# Patient Record
Sex: FEMALE | Race: AMERICAN INDIAN OR ALASKA NATIVE | HISPANIC OR LATINO | Employment: FULL TIME | URBAN - METROPOLITAN AREA
[De-identification: names, ages, dates, MRNs, and addresses within clinical notes are randomized per-mention and may not be internally consistent; named-entity substitution may affect disease eponyms.]

---

## 2023-06-27 ENCOUNTER — HOSPITAL ENCOUNTER (EMERGENCY)
Facility: HOSPITAL | Age: 48
Discharge: HOME | End: 2023-06-27
Attending: STUDENT IN AN ORGANIZED HEALTH CARE EDUCATION/TRAINING PROGRAM
Payer: MEDICAID

## 2023-06-27 VITALS
WEIGHT: 209.44 LBS | SYSTOLIC BLOOD PRESSURE: 181 MMHG | TEMPERATURE: 98 F | HEIGHT: 60 IN | RESPIRATION RATE: 20 BRPM | DIASTOLIC BLOOD PRESSURE: 124 MMHG | BODY MASS INDEX: 41.12 KG/M2 | HEART RATE: 98 BPM | OXYGEN SATURATION: 99 %

## 2023-06-27 DIAGNOSIS — M25.511 RIGHT SHOULDER PAIN, UNSPECIFIED CHRONICITY: Primary | ICD-10-CM

## 2023-06-27 DIAGNOSIS — S46.001A ROTATOR CUFF INJURY, RIGHT, INITIAL ENCOUNTER: ICD-10-CM

## 2023-06-27 PROCEDURE — 99283 EMERGENCY DEPT VISIT LOW MDM: CPT

## 2023-06-27 RX ORDER — CYCLOBENZAPRINE HCL 10 MG
10 TABLET ORAL 3 TIMES DAILY PRN
Qty: 15 TABLET | Refills: 0 | Status: SHIPPED | OUTPATIENT
Start: 2023-06-27 | End: 2023-07-02

## 2023-06-28 NOTE — ED PROVIDER NOTES
Encounter Date: 6/27/2023       History     Chief Complaint   Patient presents with    Shoulder Pain     Right shoulder pain x6 months after lifting objects at workplace.      48-year-old female presents to ED for right shoulder discomfort.  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  States it hurts primarily in the superior aspect of the right shoulder.  States she reaggravated it recently so she decided to present.  States her current job is a lot less physically demanding.  Denies any distal paresthesias or weakness.  No recurrent traumas.  No deformity.  Grossly retains full range of motion however uncomfortable for her to ABduct the arm.  No other complaints or concerns at this time.    Review of patient's allergies indicates:   Allergen Reactions    Toradol [ketorolac]      No past medical history on file.  No past surgical history on file.  No family history on file.     Review of Systems   Constitutional:  Negative for chills, diaphoresis and fever.   HENT:  Negative for congestion, rhinorrhea, sinus pain and sore throat.    Eyes:  Negative for pain, discharge and itching.   Respiratory:  Negative for cough, chest tightness and shortness of breath.    Cardiovascular:  Negative for chest pain and palpitations.   Gastrointestinal:  Negative for abdominal pain, nausea and vomiting.   Genitourinary:  Negative for dysuria, flank pain and hematuria.   Musculoskeletal:  Negative for back pain and myalgias.        R shoulder pain   Skin:  Negative for color change and rash.   Neurological:  Negative for dizziness, weakness and headaches.   Psychiatric/Behavioral:  Negative for confusion. The patient is not hyperactive.      Physical Exam     Initial Vitals [06/27/23 2149]   BP Pulse Resp Temp SpO2   (!) 181/124 98 20 97.9 °F (36.6 °C) 99 %      MAP       --         Physical Exam    Vitals reviewed.  Constitutional: She appears well-developed and  well-nourished. She is not diaphoretic. No distress.   HENT:   Head: Normocephalic and atraumatic.   Eyes: Conjunctivae and EOM are normal. Pupils are equal, round, and reactive to light.   Neck: Neck supple. No tracheal deviation present.   Normal range of motion.  Cardiovascular:  Normal rate, regular rhythm, normal heart sounds and intact distal pulses.           Pulmonary/Chest: Breath sounds normal. No respiratory distress.   Abdominal: Abdomen is soft. There is no abdominal tenderness. There is no rebound and no guarding.   Musculoskeletal:         General: Normal range of motion.        Arms:       Cervical back: Normal range of motion and neck supple.     Neurological: She is alert and oriented to person, place, and time. She has normal strength. GCS score is 15. GCS eye subscore is 4. GCS verbal subscore is 5. GCS motor subscore is 6.   Skin: Skin is warm and dry. Capillary refill takes less than 2 seconds. No rash noted.   Psychiatric: She has a normal mood and affect. Her behavior is normal. Judgment and thought content normal.       ED Course   Procedures  Labs Reviewed - No data to display       Imaging Results    None          Medications - No data to display  Medical Decision Making:   History:   Old Medical Records: I decided to obtain old medical records.  Initial Assessment:   48-year-old female presents to ED for several months right shoulder pain  Differential Diagnosis:   Sprain versus strain  Dislocation  Fracture  Impingement  Rotator cuff injury  ED Management:  clinically has a supraspinatus shoulder injury.  No evidence of dislocation, no reports of trauma or indications for x-ray imaging at this time.  Referred to both PCP and orthopedics for suspected MRI for definitive evaluation and expected rehab versus surgical treatment.  Will continue to treat symptomatically and supportively at this time.  Prescribed muscle relaxer.  Is to minimize heavy lifting or overuse of affected shoulder.   Ultimately provided strict return precautions and stable for discharge.  Released. (David)                        Clinical Impression:   Final diagnoses:  [M25.511] Right shoulder pain, unspecified chronicity (Primary)  [S46.001A] Rotator cuff injury, right, initial encounter        ED Disposition Condition    Discharge Stable          ED Prescriptions       Medication Sig Dispense Start Date End Date Auth. Provider    cyclobenzaprine (FLEXERIL) 10 MG tablet Take 1 tablet (10 mg total) by mouth 3 (three) times daily as needed for Muscle spasms. 15 tablet 6/27/2023 7/2/2023 Lorenzo Hernandez MD          Follow-up Information       Follow up With Specialties Details Why Contact Info    [facility] Emergency Medicine  As needed, If symptoms worsen [address] [facility] [address] [phone]    Primary  Schedule an appointment as soon as possible for a visit in 1 week      Ortho   next available              Lorenzo Hernandez MD  06/28/23 8699

## 2023-08-01 ENCOUNTER — OFFICE VISIT (OUTPATIENT)
Dept: INTERNAL MEDICINE | Facility: CLINIC | Age: 48
End: 2023-08-01
Payer: MEDICAID

## 2023-08-01 VITALS
WEIGHT: 211 LBS | TEMPERATURE: 99 F | HEART RATE: 106 BPM | RESPIRATION RATE: 16 BRPM | DIASTOLIC BLOOD PRESSURE: 92 MMHG | BODY MASS INDEX: 41.43 KG/M2 | HEIGHT: 60 IN | SYSTOLIC BLOOD PRESSURE: 154 MMHG

## 2023-08-01 DIAGNOSIS — I10 PRIMARY HYPERTENSION: Primary | ICD-10-CM

## 2023-08-01 DIAGNOSIS — Z91.199 NONCOMPLIANCE BY DECLINING SERVICE: ICD-10-CM

## 2023-08-01 PROBLEM — Z00.00 WELLNESS EXAMINATION: Status: ACTIVE | Noted: 2023-08-01

## 2023-08-01 PROCEDURE — 99203 PR OFFICE/OUTPT VISIT, NEW, LEVL III, 30-44 MIN: ICD-10-PCS | Mod: S$PBB,,, | Performed by: NURSE PRACTITIONER

## 2023-08-01 PROCEDURE — 1160F RVW MEDS BY RX/DR IN RCRD: CPT | Mod: CPTII,,, | Performed by: NURSE PRACTITIONER

## 2023-08-01 PROCEDURE — 3008F PR BODY MASS INDEX (BMI) DOCUMENTED: ICD-10-PCS | Mod: CPTII,,, | Performed by: NURSE PRACTITIONER

## 2023-08-01 PROCEDURE — 99203 OFFICE O/P NEW LOW 30 MIN: CPT | Mod: S$PBB,,, | Performed by: NURSE PRACTITIONER

## 2023-08-01 PROCEDURE — 1159F PR MEDICATION LIST DOCUMENTED IN MEDICAL RECORD: ICD-10-PCS | Mod: CPTII,,, | Performed by: NURSE PRACTITIONER

## 2023-08-01 PROCEDURE — 3077F PR MOST RECENT SYSTOLIC BLOOD PRESSURE >= 140 MM HG: ICD-10-PCS | Mod: CPTII,,, | Performed by: NURSE PRACTITIONER

## 2023-08-01 PROCEDURE — 3080F PR MOST RECENT DIASTOLIC BLOOD PRESSURE >= 90 MM HG: ICD-10-PCS | Mod: CPTII,,, | Performed by: NURSE PRACTITIONER

## 2023-08-01 PROCEDURE — 3080F DIAST BP >= 90 MM HG: CPT | Mod: CPTII,,, | Performed by: NURSE PRACTITIONER

## 2023-08-01 PROCEDURE — 1159F MED LIST DOCD IN RCRD: CPT | Mod: CPTII,,, | Performed by: NURSE PRACTITIONER

## 2023-08-01 PROCEDURE — 1160F PR REVIEW ALL MEDS BY PRESCRIBER/CLIN PHARMACIST DOCUMENTED: ICD-10-PCS | Mod: CPTII,,, | Performed by: NURSE PRACTITIONER

## 2023-08-01 PROCEDURE — 3077F SYST BP >= 140 MM HG: CPT | Mod: CPTII,,, | Performed by: NURSE PRACTITIONER

## 2023-08-01 PROCEDURE — 99214 OFFICE O/P EST MOD 30 MIN: CPT | Mod: PBBFAC | Performed by: NURSE PRACTITIONER

## 2023-08-01 PROCEDURE — 3008F BODY MASS INDEX DOCD: CPT | Mod: CPTII,,, | Performed by: NURSE PRACTITIONER

## 2023-08-01 RX ORDER — MULTIVITAMIN
1 TABLET ORAL
COMMUNITY

## 2023-08-01 NOTE — PROGRESS NOTES
"  LUISITO Flores   [facility]  [address] [address]      PATIENT NAME: Beth Fragoso  : 1975  DATE: 23  MRN: [id number]      Patient PCP Information       Provider PCP Type    LUISITO Flores General            Reason for Visit / Chief Complaint: Establish Care       History of Present Illness / Problem Focused Workflow     Beth Fragoso presents to the clinic with Establish Care     49 yo female here to establish care. PMH HTN.     2023  Pt here today to establish care, dicussed with patient about ordering wellness labs today and starting treatment for high blood pressure, pt reports today that due to her cultural background of  she does not like to "give her blood" for any reasons. I discussed with the pt that while I do appreciate cultural considerations for patients, there is due diligence regarding medication prescriptions and evaluation of various systems included but not limited to evaluation of kidney function. The pt reports that she also in not interested in taking any type of medications for high blood pressure; discussed with pt purpose of Primary Care Providers, the pt verbalized understanding but now reports that she wants to see an MD and means no disrespect for me as an NP but feels that an MD will understand her better regarding not completing labs. Infomred patient that I would send a message to leaders to request appointment with MD and they would reach out. No f/u appt made.   Denies chest pain, shortness of breath, cough, fever, headache, dizziness, weakness, abdominal pain, nausea, vomiting, diarrhea, constipation, black/bloody stools, unplanned weight loss, night sweats, changes in urinary patterns, burning/odor with urination, depression, anxiety, and SI/HI.             Review of Systems     Review of Systems      Medications and Allergies     Medications  Current " Outpatient Medications   Medication Instructions    multivitamin (THERAGRAN) per tablet 1 tablet, Oral         Allergies  Review of patient's allergies indicates:   Allergen Reactions    Toradol [ketorolac]        Physical Examination     Visit Vitals  BP (!) 154/92   Pulse 106   Temp 98.5 °F (36.9 °C)   Resp 16   Ht 5' (1.524 m)   Wt 95.7 kg (211 lb)   LMP 07/21/2023   BMI 41.21 kg/m²       Physical Exam      Results     Assessment        ICD-10-CM ICD-9-CM   1. Primary hypertension  I10 401.9   2. Noncompliance by declining service  Z91.199 V64.2        Plan      Problem List Items Addressed This Visit          Cardiac/Vascular    Primary hypertension - Primary    Overview     Low Sodium Diet (Dash Diet - less than 2 grams of sodium per day).  Maintain healthy weight with goal BMI <30. Exercise 30 minutes per day 5 days per week.           Current Assessment & Plan     /96          Other Visit Diagnoses       Noncompliance by declining service                Future Appointments   Date Time Provider Department Center   10/3/2023  8:20 AM Etta Dominguez NP [facility]        Follow up in 15 days (on 8/16/2023), or if symptoms worsen or fail to improve.      Signature:     [facility]  [address] [facility] [address]    Date of encounter: 8/1/23

## 2023-08-29 ENCOUNTER — HOSPITAL ENCOUNTER (EMERGENCY)
Facility: HOSPITAL | Age: 48
Discharge: HOME | End: 2023-08-30
Attending: STUDENT IN AN ORGANIZED HEALTH CARE EDUCATION/TRAINING PROGRAM
Payer: MEDICAID

## 2023-08-29 DIAGNOSIS — R10.11 RIGHT UPPER QUADRANT ABDOMINAL PAIN: ICD-10-CM

## 2023-08-29 DIAGNOSIS — I10 HYPERTENSION, UNSPECIFIED TYPE: Primary | ICD-10-CM

## 2023-08-29 DIAGNOSIS — R06.02 SHORTNESS OF BREATH: ICD-10-CM

## 2023-08-29 DIAGNOSIS — E27.8 ADRENAL NODULE: ICD-10-CM

## 2023-08-29 LAB
ALBUMIN SERPL-MCNC: 3.6 G/DL (ref 3.5–5)
ALBUMIN/GLOB SERPL: 1.1 RATIO (ref 1.1–2)
ALP SERPL-CCNC: 76 UNIT/L (ref 40–150)
ALT SERPL-CCNC: 17 UNIT/L (ref 0–55)
AMPHET UR QL SCN: NEGATIVE
APPEARANCE UR: CLEAR
AST SERPL-CCNC: 15 UNIT/L (ref 5–34)
B-HCG SERPL QL: NEGATIVE
BACTERIA #/AREA URNS AUTO: ABNORMAL /HPF
BARBITURATE SCN PRESENT UR: NEGATIVE
BASOPHILS # BLD AUTO: 0.04 X10(3)/MCL
BASOPHILS NFR BLD AUTO: 0.4 %
BENZODIAZ UR QL SCN: NEGATIVE
BILIRUB SERPL-MCNC: 0.2 MG/DL
BILIRUB UR QL STRIP.AUTO: NEGATIVE
BNP BLD-MCNC: 17.4 PG/ML
BUN SERPL-MCNC: 16.2 MG/DL (ref 7–18.7)
CALCIUM SERPL-MCNC: 9.4 MG/DL (ref 8.4–10.2)
CANNABINOIDS UR QL SCN: NEGATIVE
CHLORIDE SERPL-SCNC: 105 MMOL/L (ref 98–107)
CO2 SERPL-SCNC: 28 MMOL/L (ref 22–29)
COCAINE UR QL SCN: NEGATIVE
COLOR UR: YELLOW
CREAT SERPL-MCNC: 0.93 MG/DL (ref 0.55–1.02)
EOSINOPHIL # BLD AUTO: 0.3 X10(3)/MCL (ref 0–0.9)
EOSINOPHIL NFR BLD AUTO: 2.7 %
ERYTHROCYTE [DISTWIDTH] IN BLOOD BY AUTOMATED COUNT: 13.9 % (ref 11.5–17)
ETHANOL SERPL-MCNC: <10 MG/DL
FENTANYL UR QL SCN: NEGATIVE
GFR SERPLBLD CREATININE-BSD FMLA CKD-EPI: >60 MLS/MIN/1.73/M2
GLOBULIN SER-MCNC: 3.2 GM/DL (ref 2.4–3.5)
GLUCOSE SERPL-MCNC: 89 MG/DL (ref 74–100)
GLUCOSE UR QL STRIP.AUTO: NEGATIVE
HCT VFR BLD AUTO: 39.2 % (ref 37–47)
HGB BLD-MCNC: 13 G/DL (ref 12–16)
IMM GRANULOCYTES # BLD AUTO: 0.08 X10(3)/MCL (ref 0–0.04)
IMM GRANULOCYTES NFR BLD AUTO: 0.7 %
KETONES UR QL STRIP.AUTO: NEGATIVE
LEUKOCYTE ESTERASE UR QL STRIP.AUTO: ABNORMAL
LYMPHOCYTES # BLD AUTO: 3.69 X10(3)/MCL (ref 0.6–4.6)
LYMPHOCYTES NFR BLD AUTO: 33.4 %
MCH RBC QN AUTO: 31.3 PG (ref 27–31)
MCHC RBC AUTO-ENTMCNC: 33.2 G/DL (ref 33–36)
MCV RBC AUTO: 94.2 FL (ref 80–94)
MDMA UR QL SCN: NEGATIVE
MONOCYTES # BLD AUTO: 0.76 X10(3)/MCL (ref 0.1–1.3)
MONOCYTES NFR BLD AUTO: 6.9 %
NEUTROPHILS # BLD AUTO: 6.17 X10(3)/MCL (ref 2.1–9.2)
NEUTROPHILS NFR BLD AUTO: 55.9 %
NITRITE UR QL STRIP.AUTO: NEGATIVE
NRBC BLD AUTO-RTO: 0 %
OPIATES UR QL SCN: NEGATIVE
PCP UR QL: POSITIVE
PH UR STRIP.AUTO: 5.5 [PH]
PH UR: 5.5 [PH] (ref 3–11)
PLATELET # BLD AUTO: 318 X10(3)/MCL (ref 130–400)
PMV BLD AUTO: 9.9 FL (ref 7.4–10.4)
POTASSIUM SERPL-SCNC: 4.5 MMOL/L (ref 3.5–5.1)
PROT SERPL-MCNC: 6.8 GM/DL (ref 6.4–8.3)
PROT UR QL STRIP.AUTO: NEGATIVE
RBC # BLD AUTO: 4.16 X10(6)/MCL (ref 4.2–5.4)
RBC #/AREA URNS AUTO: ABNORMAL /HPF
RBC UR QL AUTO: NEGATIVE
SODIUM SERPL-SCNC: 142 MMOL/L (ref 136–145)
SP GR UR STRIP.AUTO: 1.02 (ref 1–1.03)
SPECIFIC GRAVITY, URINE AUTO (.000) (OHS): 1.02 (ref 1–1.03)
SQUAMOUS #/AREA URNS AUTO: <5 /HPF
TROPONIN I SERPL-MCNC: <0.01 NG/ML (ref 0–0.04)
UROBILINOGEN UR STRIP-ACNC: 0.2
WBC # SPEC AUTO: 11.04 X10(3)/MCL (ref 4.5–11.5)
WBC #/AREA URNS AUTO: <5 /HPF

## 2023-08-29 PROCEDURE — 83880 ASSAY OF NATRIURETIC PEPTIDE: CPT | Performed by: PHYSICIAN ASSISTANT

## 2023-08-29 PROCEDURE — 84484 ASSAY OF TROPONIN QUANT: CPT | Performed by: PHYSICIAN ASSISTANT

## 2023-08-29 PROCEDURE — 93010 EKG 12-LEAD: ICD-10-PCS | Mod: ,,, | Performed by: INTERNAL MEDICINE

## 2023-08-29 PROCEDURE — 81001 URINALYSIS AUTO W/SCOPE: CPT | Performed by: PHYSICIAN ASSISTANT

## 2023-08-29 PROCEDURE — 80053 COMPREHEN METABOLIC PANEL: CPT | Performed by: PHYSICIAN ASSISTANT

## 2023-08-29 PROCEDURE — 81025 URINE PREGNANCY TEST: CPT | Performed by: PHYSICIAN ASSISTANT

## 2023-08-29 PROCEDURE — 93005 ELECTROCARDIOGRAM TRACING: CPT

## 2023-08-29 PROCEDURE — 99285 EMERGENCY DEPT VISIT HI MDM: CPT | Mod: 25

## 2023-08-29 PROCEDURE — 93010 ELECTROCARDIOGRAM REPORT: CPT | Mod: ,,, | Performed by: INTERNAL MEDICINE

## 2023-08-29 PROCEDURE — 85025 COMPLETE CBC W/AUTO DIFF WBC: CPT | Performed by: PHYSICIAN ASSISTANT

## 2023-08-29 PROCEDURE — 25000003 PHARM REV CODE 250

## 2023-08-29 PROCEDURE — 82077 ASSAY SPEC XCP UR&BREATH IA: CPT | Performed by: STUDENT IN AN ORGANIZED HEALTH CARE EDUCATION/TRAINING PROGRAM

## 2023-08-29 PROCEDURE — 80307 DRUG TEST PRSMV CHEM ANLYZR: CPT | Performed by: STUDENT IN AN ORGANIZED HEALTH CARE EDUCATION/TRAINING PROGRAM

## 2023-08-29 RX ORDER — HYDROCODONE BITARTRATE AND ACETAMINOPHEN 5; 325 MG/1; MG/1
1 TABLET ORAL
Status: COMPLETED | OUTPATIENT
Start: 2023-08-29 | End: 2023-08-29

## 2023-08-29 RX ORDER — AMLODIPINE BESYLATE 5 MG/1
5 TABLET ORAL DAILY
Qty: 30 TABLET | Refills: 0 | Status: SHIPPED | OUTPATIENT
Start: 2023-08-29 | End: 2023-09-28

## 2023-08-29 RX ADMIN — HYDROCODONE BITARTRATE AND ACETAMINOPHEN 1 TABLET: 5; 325 TABLET ORAL at 08:08

## 2023-08-29 RX ADMIN — IOPAMIDOL 100 ML: 755 INJECTION, SOLUTION INTRAVENOUS at 11:08

## 2023-08-29 NOTE — FIRST PROVIDER EVALUATION
Medical screening examination initiated.  I have conducted a focused provider triage encounter, findings are as follows:    Chief Complaint   Patient presents with    Shortness of Breath    Chest Pain     Pt reports sob and cp that started today. Reports being at her OBGYN office today and was sent here for high blood pressure. Pt is unsure if she is pregnant. LMP 7/15/23     Brief history of present illness:  48 y.o. female presents to the ED with chest pain and SOB that started today along with high BP; No hx. Went to OB to see if she was pregnant and was sent here. LMP 7/15    Vitals:    08/29/23 1719   BP: (!) 163/97   Pulse: 104   Resp: 20   Temp: 97.8 °F (36.6 °C)   TempSrc: Oral   SpO2: 100%   Weight: 100.7 kg (222 lb)   Height: 5' (1.524 m)       Pertinent physical exam:  Awake, alert, ambulatory, non-labored respirations    Brief workup plan:  labs, EKG, CXR, UA    Preliminary workup initiated; this workup will be continued and followed by the physician or advanced practice provider that is assigned to the patient when roomed.

## 2023-08-30 VITALS
RESPIRATION RATE: 15 BRPM | WEIGHT: 222 LBS | HEIGHT: 60 IN | BODY MASS INDEX: 43.59 KG/M2 | HEART RATE: 93 BPM | SYSTOLIC BLOOD PRESSURE: 134 MMHG | TEMPERATURE: 97 F | DIASTOLIC BLOOD PRESSURE: 82 MMHG | OXYGEN SATURATION: 100 %

## 2023-08-30 PROCEDURE — 25500020 PHARM REV CODE 255: Performed by: STUDENT IN AN ORGANIZED HEALTH CARE EDUCATION/TRAINING PROGRAM

## 2023-08-30 NOTE — ED PROVIDER NOTES
Encounter Date: 8/29/2023    SCRIBE #1 NOTE: I, Qiana Kc, am scribing for, and in the presence of,  Brandon Moura MD. I have scribed the following portions of the note - Other sections scribed: HPI, ROS, PE.       History     Chief Complaint   Patient presents with    Shortness of Breath    Chest Pain     Pt reports sob and cp that started today. Reports being at her OBGYN office today and was sent here for high blood pressure. Pt is unsure if she is pregnant. LMP 7/15/23     48 year old female presents to the ED for back pain that radiates to her abdomen beginning 2 months ago. Patient states she was informed that she has a 1.5 cm mass on her adrenal gland as well as a cyst on her left ovary after getting imaging done at [facility]. Patient reports she felt like she was experiencing pregnancy symptoms so she had an appointment with OBGYN Dr. Wells today. He told her to report to the ED for further evaluation due to high blood pressure. She has a follow-up appointment with Dr. Dinh tomorrow at 13:45. Patient reports chest pain, back pain, and abdominal pain. Patient states she is not on blood pressure medication     PCP: Fabrizio Hickman MD    The history is provided by the patient. No  was used.   Back Pain   The current episode started several weeks ago. The quality of the pain is described as shooting. Radiates to: abdomen. Associated symptoms include chest pain and abdominal pain. Pertinent negatives include no fever, no dysuria and no weakness.     Review of patient's allergies indicates:   Allergen Reactions    Toradol [ketorolac]      History reviewed. No pertinent past medical history.  Past Surgical History:   Procedure Laterality Date    BRAIN SURGERY       Family History   Problem Relation Age of Onset    Hypertension Mother     Diabetes Mother     Heart disease Father      Social History     Tobacco Use    Smoking status: Some Days      Average packs/day: 0.5 packs/day for 38.7 years (19.0 ttl pk-yrs)     Types: Cigarettes     Start date: 1985    Smokeless tobacco: Never     Review of Systems   Constitutional:  Negative for fever.   HENT:  Negative for sore throat.    Eyes:  Negative for visual disturbance.   Respiratory:  Negative for shortness of breath.    Cardiovascular:  Positive for chest pain.   Gastrointestinal:  Positive for abdominal pain.   Genitourinary:  Negative for dysuria.   Musculoskeletal:  Positive for back pain. Negative for joint swelling.   Skin:  Negative for rash.   Neurological:  Negative for weakness.   Psychiatric/Behavioral:  Negative for confusion.        Physical Exam     Initial Vitals [08/29/23 1719]   BP Pulse Resp Temp SpO2   (!) 163/97 104 20 97.8 °F (36.6 °C) 100 %      MAP       --         Physical Exam    Nursing note and vitals reviewed.  Constitutional: She appears well-developed and well-nourished. She is not diaphoretic. No distress.   HENT:   Head: Normocephalic and atraumatic.   Eyes: Conjunctivae and EOM are normal. Pupils are equal, round, and reactive to light.   Neck:   Normal range of motion.  Cardiovascular:  Normal rate, regular rhythm, normal heart sounds and intact distal pulses.           No murmur heard.  Pulmonary/Chest: Breath sounds normal. No respiratory distress. She has no wheezes. She has no rales.   Abdominal: Abdomen is soft. She exhibits no distension. There is no abdominal tenderness.   RUQ tenderness to palpation There is no rebound.   Musculoskeletal:         General: No tenderness or edema. Normal range of motion.      Cervical back: Normal range of motion.     Neurological: She is alert and oriented to person, place, and time. She has normal strength. No cranial nerve deficit. GCS score is 15. GCS eye subscore is 4. GCS verbal subscore is 5. GCS motor subscore is 6.   Skin: Skin is warm and dry. Capillary refill takes less than 2 seconds. No rash noted. No erythema.    Psychiatric: She has a normal mood and affect.         ED Course   Procedures  Labs Reviewed   URINALYSIS, REFLEX TO URINE CULTURE - Abnormal; Notable for the following components:       Result Value    Leukocyte Esterase, UA 1+ (*)     All other components within normal limits   CBC WITH DIFFERENTIAL - Abnormal; Notable for the following components:    RBC 4.16 (*)     MCV 94.2 (*)     MCH 31.3 (*)     IG# 0.08 (*)     All other components within normal limits   URINALYSIS, MICROSCOPIC - Abnormal; Notable for the following components:    Bacteria, UA 1+ (*)     All other components within normal limits   DRUG SCREEN, URINE (BEAKER) - Abnormal; Notable for the following components:    Phencyclidine, Urine Positive (*)     All other components within normal limits    Narrative:     Cut off concentrations:    Amphetamines - 1000 ng/ml  Barbiturates - 200 ng/ml  Benzodiazepine - 200 ng/ml  Cannabinoids (THC) - 50 ng/ml  Cocaine - 300 ng/ml  Fentanyl - 1.0 ng/ml  MDMA - 500 ng/ml  Opiates - 300 ng/ml   Phencyclidine (PCP) - 25 ng/ml    Specimen submitted for drug analysis and tested for pH and specific gravity in order to evaluate sample integrity. Suspect tampering if specific gravity is <1.003 and/or pH is not within the range of 4.5 - 8.0  False negatives may result form substances such as bleach added to urine.  False positives may result for the presence of a substance with similar chemical structure to the drug or its metabolite.    This test provides only a PRELIMINARY analytical test result. A more specific alternate chemical method must be used in order to obtain a confirmed analytical result. Gas chromatography/mass spectrometry (GC/MS) is the preferred confirmatory method. Other chemical confirmation methods are available. Clinical consideration and professional judgement should be applied to any drug of abuse test result, particularly when preliminary positive results are used.    Positive results will be  confirmed only at the physicians request. Unconfirmed screening results are to be used only for medical purposes (treatment).        B-TYPE NATRIURETIC PEPTIDE - Normal   TROPONIN I - Normal   PREGNANCY TEST, URINE RAPID - Normal   ALCOHOL,MEDICAL (ETHANOL) - Normal   CBC W/ AUTO DIFFERENTIAL    Narrative:     The following orders were created for panel order CBC auto differential.  Procedure                               Abnormality         Status                     ---------                               -----------         ------                     CBC with Differential[[id number]]        Abnormal            Final result                 Please view results for these tests on the individual orders.   COMPREHENSIVE METABOLIC PANEL     EKG Readings: (Independently Interpreted)   Initial Reading: No STEMI. Rhythm: Normal Sinus Rhythm. Heart Rate: 99. Ectopy: No Ectopy. Conduction: Normal. ST Segments: Normal ST Segments. T Waves: Normal. Axis: Normal.   Performed at 17:18     ECG Results              EKG 12-lead (Final result)  Result time 08/29/23 19:39:27      Final result by Interface, [facility] (08/29/23 19:39:27)                   Narrative:    Test Reason : R06.02,    Vent. Rate : 099 BPM     Atrial Rate : 099 BPM     P-R Int : 140 ms          QRS Dur : 070 ms      QT Int : 332 ms       P-R-T Axes : 049 000 035 degrees     QTc Int : 426 ms    Normal sinus rhythm  Cannot rule out Anterior infarct ,age undetermined  Abnormal ECG  No previous ECGs available  Confirmed by Jermain Cunningham MD ([id number]) on 8/29/2023 7:39:13 PM    Referred By:             Confirmed By:Jermain Cunningham MD                                     EKG 12-LEAD (Final result)  Result time 09/05/23 13:02:04      Final result by Unknown User (09/05/23 13:02:04)                                      Imaging Results              CT Abdomen Pelvis With Contrast (Final result)  Result time 08/30/23 08:07:12      Final result by Alexis Salmon MD  (08/30/23 08:07:12)                   Impression:      1. No acute process identified.  2. 15 mm left adrenal nodule, statistically adenoma in the absence of a history of malignancy.  Depending on prior imaging, a 1 year follow-up abdominal CT with and without IV contrast can be considered to ensure stability of this nodule.  If there is a history of malignancy with no prior imaging to confirm stability, short-term follow-up abdominal CT with and without IV contrast recommended for further assessment.  No significant discrepancy between my interpretation and the preliminary radiology report.      Electronically signed by: Alexis Salmon  Date:    08/30/2023  Time:    08:07               Narrative:    EXAMINATION:  CT ABDOMEN PELVIS WITH CONTRAST    CLINICAL HISTORY:  Abdominal abscess/infection suspected;Abdominal pain, acute, nonlocalized;    TECHNIQUE:  CT imaging of the abdomen and pelvis after intravenous contrast. Dose length product 663 mGycm. Automatic exposure control, adjustment of mA/kV or iterative reconstruction technique used to limit radiation dose.    COMPARISON:  No relevant comparison studies available at the time of dictation.    FINDINGS:  Liver/biliary: Normal liver.  No radiodense gallstones. No intra or extrahepatic biliary ductal dilation.    Pancreas: Normal.    Spleen: Normal.    Adrenals: 15 mm hypodense left adrenal nodule.    Genitourinary: Symmetric renal enhancement. No hydronephrosis. Bladder within normal limits. No pelvic mass.    Stomach/bowel: No evidence of bowel obstruction. Normal appendix. No definitive sites of bowel inflammation.    Lymph nodes/peritoneum: No pathologically enlarged lymph node identified. No ascites or free air. No fluid collection.    Vasculature: Normal abdominal aortic caliber.    Abdominal wall: Normal.    Lung bases: No consolidation or pleural effusion.    Musculoskeletal: No acute osseous findings. Grade 1 anterolisthesis of L5 on L6.                         Preliminary result by Alexis Salmon MD (08/29/23 23:55:03)                   Narrative:    START OF REPORT:  TECHNIQUE: CT OF THE ABDOMEN AND PELVIS WAS PERFORMED WITH AXIAL IMAGES AS WELL AS SAGITTAL AND CORONAL RECONSTRUCTION IMAGES WITH INTRAVENOUS CONTRAST BUT WITHOUT ORAL CONTRAST.    COMPARISON: NONE AVAILABLE.    CLINICAL HISTORY: LOWER ABDOMINAL PAIN.    DOSAGE INFORMATION: AUTOMATED EXPOSURE CONTROL WAS UTILIZED.    Findings:  Lines and Tubes: None.  Thorax:  Lungs: The visualized lung bases appear unremarkable. No focal infiltrate or consolidation is seen.  Pleura: No effusions or thickening.  Heart: The heart size is within normal limits.  Abdomen:  Abdominal Wall: There is a subtle uncomplicated umbilical hernia which contains mesenteric fat.  Liver: The liver appears unremarkable. No intrahepatic bile duct dilatation is seen in the liver.  Biliary System: No extrahepatic biliary duct dilatation is seen.  Gallbladder: The gallbladder appears unremarkable.  Pancreas: The pancreas appears unremarkable.  Spleen: The spleen appears unremarkable.  Adrenals: There is a 1.4 cm heterogenously hypodense lesion noted in the left adrenal gland. These findings likely reflect an adenoma.  Kidneys: The kidneys appear unremarkable with no stones cysts masses or hydronephrosis.  Aorta: The abdominal aorta appears unremarkable.  IVC: Unremarkable.  Bowel:  Esophagus: The visualized esophagus appears unremarkable.  Stomach: The stomach appears unremarkable.  Duodenum: Unremarkable appearing duodenum.  Small Bowel: The small bowel appears unremarkable.  Colon: There is moderate stool in the colon which could reflect an element of constipation. Scattered diverticula are seen throughout the colon. No associated inflammatory stranding is seen to suggest diverticulitis.  Appendix: The appendix appears unremarkable and is partially seen on Image 47, Series 2.  Peritoneum: No intraperitoneal free air or ascites is  seen.    Pelvis:  Bladder: The bladder appears unremarkable.  Female:  Uterus: The uterus appears unremarkable.  Ovaries: The ovaries appear unremarkable with probable bilateral physiologic cysts.    Bony structures:  Dorsal Spine: There is mild spondylosis of the visualized dorsal spine. There is a grade I anterolisthesis of L4 over L5 noted.  Bony Pelvis: The visualized bony structures of the pelvis appear unremarkable.      Impression:  1. There is a 1.4 cm heterogenously hypodense lesion noted in the left adrenal gland. These findings likely reflect an adenoma.  2. No acute intraabdominal or pelvic solid organ or bowel pathology identified. Details and other findings as discussed above.                                         X-Ray Chest PA And Lateral (Final result)  Result time 08/29/23 17:33:24      Final result by Ludmila Mathews MD (08/29/23 17:33:24)                   Impression:      No acute abnormality of the chest.      Electronically signed by: Ludmila Mathews  Date:    08/29/2023  Time:    17:33               Narrative:    EXAMINATION:  XR CHEST PA AND LATERAL    CLINICAL HISTORY:  Shortness of breath    TECHNIQUE:  PA and lateral chest radiographs    COMPARISON:  None.    FINDINGS:  The heart is normal in size.  The lungs are clear.  There is no pleural effusion or visible pneumothorax.                                       Medications   HYDROcodone-acetaminophen 5-325 mg per tablet 1 tablet (1 tablet Oral Given 8/29/23 2031)   iopamidoL (ISOVUE-370) injection 100 mL (100 mLs Intravenous Given 8/29/23 2356)     Medical Decision Making  Problems Addressed:  Adrenal nodule: chronic illness or injury  Hypertension, unspecified type: acute illness or injury that poses a threat to life or bodily functions  Right upper quadrant abdominal pain: acute illness or injury that poses a threat to life or bodily functions  Shortness of breath: acute illness or injury that poses a threat to life or bodily  functions    Amount and/or Complexity of Data Reviewed  External Data Reviewed: labs and radiology.  Labs: ordered.  Radiology: ordered and independent interpretation performed.  ECG/medicine tests: ordered and independent interpretation performed.    Risk  Prescription drug management.  Parenteral controlled substances.            Scribe Attestation:   Scribe #1: I performed the above scribed service and the documentation accurately describes the services I performed. I attest to the accuracy of the note.    Attending Attestation:           Physician Attestation for Scribe:  Physician Attestation Statement for Scribe #1: I, Brandon Moura MD, reviewed documentation, as scribed by Qiana Kc in my presence, and it is both accurate and complete.             ED Course as of 09/21/23 1226   Tue Aug 29, 2023   2346 Patient has an appointment with Dr. Dinh tomorrow. [RP]      ED Course User Index  [RP] Brandon Moura MD               Medical Decision Making:   History:   I obtained history from: someone other than patient.       <> Summary of History: Collateral from family  Old Medical Records: I decided to obtain old medical records.  Old Records Summarized: records from previous admission(s), records from clinic visits and records from another hospital.       <> Summary of Records: Reviewed old records [facility]  Initial Assessment:   SOB/CP, high blood pressure  Differential Diagnosis:   Judging by the patient's chief complaint and pertinent history, the patient has the following possible differential diagnoses, including but not limited to the following.  Some of these are deemed to be lower likelihood and some more likely based on my physical exam and history combined with possible lab work and/or imaging studies.   Please see the pertinent studies, and refer to the HPI.  Some of these diagnoses will take further evaluation to fully rule out, perhaps as an outpatient and  the patient was encouraged to follow up when discharged for more comprehensive evaluation.    Hypertensive urgency, hypertensive emergency, hypertensive encephalopathy, acute heart failure, myocardial ischemia, pulmonary edema, nephropathy, renal failure, proteinuria, electrolyte abnormalities, asymptomatic elevated blood pressure, chronic hypertension, medication non-compliance    Independently Interpreted Test(s):   I have ordered and independently interpreted X-rays - see prior notes.  I have ordered and independently interpreted EKG Reading(s) - see prior notes  Clinical Tests:   Lab Tests: Ordered and Reviewed  Radiological Study: Reviewed and Ordered  Medical Tests: Reviewed and Ordered  ED Management:    Patient is a 48-year-old female presents to the emergency department after being seen at OBGYN office for some elevated blood pressure.  See HPI.  See physical exam.  Also reports some chronic abdominal pain, shortness of breath, chest pain.  EKG without ST elevation.  Chest x-ray without any evidence of widened mediastinum, pneumothorax or infiltrate.  Labs obtained as are noted.  Patient's blood pressure control.  On reassessment patient resting comfortably. Reassessed patient.  Patient is resting comfortably.  Discussed all results.  Discussed need for follow-up.  Discussed return precautions.  Answered all questions at this time.  Will start patient on Norvasc.  Hemodynamically stable for continued outpatient management with strict return precautions.  Patient and family verbalized understanding agreed to plan.        Clinical Impression:   Final diagnoses:  [R06.02] Shortness of breath  [I10] Hypertension, unspecified type (Primary)  [E27.8] Adrenal nodule  [R10.11] Right upper quadrant abdominal pain        ED Disposition Condition    Discharge           ED Prescriptions       Medication Sig Dispense Start Date End Date Auth. Provider    amLODIPine (NORVASC) 5 MG tablet Take 1 tablet (5 mg total) by  mouth once daily. 30 tablet 8/29/2023 9/28/2023 Brandon Moura MD          Follow-up Information       Follow up With Specialties Details Why Contact Info    Mariana Herman, LUISITO Nurse Practitioner   [address] [facility] [address] [phone]               Brandon Moura MD  09/21/23 3438

## 2023-08-30 NOTE — DISCHARGE INSTRUCTIONS
Follow-up with your primary care physician.  Keep appointment for tomorrow.      Began taking Norvasc 5 mg as prescribed.      Return to the emergency department for any new or worsening symptoms, nausea, vomiting, difficulty breathing, fever, headache, or any other concerns.

## 2023-10-03 ENCOUNTER — OFFICE VISIT (OUTPATIENT)
Dept: ORTHOPEDICS | Facility: CLINIC | Age: 48
End: 2023-10-03
Payer: MEDICAID

## 2023-10-03 ENCOUNTER — HOSPITAL ENCOUNTER (OUTPATIENT)
Dept: RADIOLOGY | Facility: HOSPITAL | Age: 48
Discharge: HOME | End: 2023-10-03
Attending: NURSE PRACTITIONER
Payer: MEDICAID

## 2023-10-03 DIAGNOSIS — E66.9 OBESITY, UNSPECIFIED CLASSIFICATION, UNSPECIFIED OBESITY TYPE, UNSPECIFIED WHETHER SERIOUS COMORBIDITY PRESENT: ICD-10-CM

## 2023-10-03 DIAGNOSIS — M25.511 RIGHT SHOULDER PAIN, UNSPECIFIED CHRONICITY: ICD-10-CM

## 2023-10-03 DIAGNOSIS — M75.101 ROTATOR CUFF SYNDROME, RIGHT: Primary | ICD-10-CM

## 2023-10-03 PROCEDURE — 1160F RVW MEDS BY RX/DR IN RCRD: CPT | Mod: CPTII,,, | Performed by: NURSE PRACTITIONER

## 2023-10-03 PROCEDURE — 73030 X-RAY EXAM OF SHOULDER: CPT | Mod: TC,RT

## 2023-10-03 PROCEDURE — 20610: ICD-10-PCS | Mod: S$PBB,RT,, | Performed by: NURSE PRACTITIONER

## 2023-10-03 PROCEDURE — 1159F MED LIST DOCD IN RCRD: CPT | Mod: CPTII,,, | Performed by: NURSE PRACTITIONER

## 2023-10-03 PROCEDURE — 99213 OFFICE O/P EST LOW 20 MIN: CPT | Mod: PBBFAC,25 | Performed by: NURSE PRACTITIONER

## 2023-10-03 PROCEDURE — 99215 OFFICE O/P EST HI 40 MIN: CPT | Mod: 25,S$PBB,, | Performed by: NURSE PRACTITIONER

## 2023-10-03 PROCEDURE — 99215 PR OFFICE/OUTPT VISIT, EST, LEVL V, 40-54 MIN: ICD-10-PCS | Mod: 25,S$PBB,, | Performed by: NURSE PRACTITIONER

## 2023-10-03 PROCEDURE — 1159F PR MEDICATION LIST DOCUMENTED IN MEDICAL RECORD: ICD-10-PCS | Mod: CPTII,,, | Performed by: NURSE PRACTITIONER

## 2023-10-03 PROCEDURE — 1160F PR REVIEW ALL MEDS BY PRESCRIBER/CLIN PHARMACIST DOCUMENTED: ICD-10-PCS | Mod: CPTII,,, | Performed by: NURSE PRACTITIONER

## 2023-10-03 PROCEDURE — 20610 DRAIN/INJ JOINT/BURSA W/O US: CPT | Mod: PBBFAC,RT | Performed by: NURSE PRACTITIONER

## 2023-10-03 RX ORDER — AMLODIPINE BESYLATE 10 MG/1
10 TABLET ORAL
COMMUNITY
Start: 2023-08-30

## 2023-10-03 RX ORDER — PRAVASTATIN SODIUM 20 MG/1
20 TABLET ORAL NIGHTLY
COMMUNITY
Start: 2023-09-28

## 2023-10-03 RX ORDER — LIDOCAINE HYDROCHLORIDE 10 MG/ML
5 INJECTION, SOLUTION EPIDURAL; INFILTRATION; INTRACAUDAL; PERINEURAL
Status: COMPLETED | OUTPATIENT
Start: 2023-10-03 | End: 2023-10-03

## 2023-10-03 RX ORDER — ERGOCALCIFEROL 1.25 MG/1
50000 CAPSULE ORAL
COMMUNITY
Start: 2023-09-25

## 2023-10-03 RX ORDER — TRIAMCINOLONE ACETONIDE 40 MG/ML
40 INJECTION, SUSPENSION INTRA-ARTICULAR; INTRAMUSCULAR
Status: COMPLETED | OUTPATIENT
Start: 2023-10-03 | End: 2023-10-03

## 2023-10-03 RX ORDER — IBUPROFEN 100 MG/5ML
1000 SUSPENSION, ORAL (FINAL DOSE FORM) ORAL DAILY
COMMUNITY

## 2023-10-03 RX ORDER — SAW PALMETTO 160 MG
160 CAPSULE ORAL 2 TIMES DAILY
COMMUNITY

## 2023-10-03 RX ADMIN — TRIAMCINOLONE ACETONIDE 40 MG: 40 INJECTION, SUSPENSION INTRA-ARTICULAR; INTRAMUSCULAR at 08:10

## 2023-10-03 RX ADMIN — LIDOCAINE HYDROCHLORIDE 5 ML: 10 INJECTION, SOLUTION EPIDURAL; INFILTRATION; INTRACAUDAL; PERINEURAL at 08:10

## 2023-10-03 NOTE — PROGRESS NOTES
Subjective:   PATIENT ID: Beth Fragoso is a 48 y.o. female. Smoker. 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    Seen [facility] orth[facility] for same DX since n/a.   CHIEF COMPLAINT: Shoulder Pain of the Right Shoulder    HPI:    Right aching lateral  and posterior shoulder pain. Right dominate  Injury: no known injury  Onset: several years ago fluctuates   Modifying Factors: increased with overhead movement, worse with activity, increased with lifting, increased when lying on affected shoulder, and increased pain at PM  Associated Symptoms: crepitus, decreased ROM, and difficulty sleeping s/t pain  Activity: sedentary with light activity and pain moderately interferes with ADLs .   Previous Treatments: RX NSAIDs without adequate relief at this time.   PMH: negative for contraindications for NSAID use  Family History: + OA    NOTE: New patient referred for right shoulder pain with limited conservative treatments.  Symptoms affecting ADLs and ability to work.     Current Outpatient Medications:     amLODIPine (NORVASC) 10 MG tablet, Take 10 mg by mouth., Disp: , Rfl:     ascorbic acid, vitamin C, (VITAMIN C) 1000 MG tablet, Take 1,000 mg by mouth once daily., Disp: , Rfl:     ergocalciferol (ERGOCALCIFEROL) 50,000 unit Cap, Take 50,000 Units by mouth every 7 days., Disp: , Rfl:     multivitamin (THERAGRAN) per tablet, Take 1 tablet by mouth., Disp: , Rfl:     pravastatin (PRAVACHOL) 20 MG tablet, Take 20 mg by mouth every evening., Disp: , Rfl:     saw palmetto 160 MG capsule, Take 160 mg by mouth 2 (two) times daily., Disp: , Rfl:     Current Facility-Administered Medications:     LIDOcaine (PF) 10 mg/ml (1%) injection 50 mg, 5 mL, Other, 1 time in Clinic/HOD, Etta Dominguez NP    triamcinolone acetonide injection 40 mg, 40 mg, Intra-articular, 1 time in Clinic/HOD, Etta Dominguez NP  Review of patient's allergies indicates:   Allergen Reactions    Toradol [ketorolac]      No results found  "for: "HGBA1C"   There is no height or weight on file to calculate BMI.   Vitals:    10/03/23 0817   PainSc: 10-Worst pain ever      REVIEW OF SYSTEMS:  A ten-point review of systems was performed and is negative, except as mentioned above   Objective:   MSK Shoulder Exam  General:  no apparent distress, no pain indicators,  obesity  Inspection: poor posture with rounded upper back noted  LEFT SHOULDER RIGHT SHOULDER   no soft tissue swelling, no guarding/ self imposed immobilization of shoulder, no winged scapula, no erythema, no contusion,  no masses, no scars, no clavicle deformity, no shoulder dislocation deformity no soft tissue swelling, noted guarding/ self imposed immobilization of shoulder, no erythema, no contusion,  no masses, no scars, no clavicle deformity, no shoulder dislocation deformity     Palpation:     LEFT SHOULDER RIGHT SHOULDER   normal temperature, no deconditioning noted, no tenderness noted of suprasternal notch/ sternoclavicular joint, clavicle, AC joint, acromion, subacromial bursa, subdeltoid bursa, bicipital groove, GH joint, supraspinatus, infraspinatus, teres minor , trapezius, rhomboids, scapula spine, bicep, and cervical spine, no  crepitus with ROM, no palpable hypersensitive nodule/ trigger point normal temperature, noted deconditioning supraspinatus, noted deconditioning infraspinatus, noted tenderness of AC joint, acromion, subacromial bursa, subdeltoid bursa, bicipital groove, supraspinatus, and infraspinatus, no tenderness noted of suprasternal notch/ sternoclavicular joint, clavicle, GH joint, teres minor , trapezius, rhomboids, scapula spine, and bicep, no  crepitus with ROM, no palpable hypersensitive nodule/ trigger point      ROM Active Flexion / Extension  LEFT SHOULDER RIGHT SHOULDER   Abduction 180  Horizontal Adduction 45  Posterior Extension 45  Forward Flexion 180  Internal Rotation T4 - T8  External Rotation 60 Abduction 100  Horizontal Adduction 30  Posterior " Extension 30  Forward Flexion 120  Internal Rotation sacrum  External Rotation 45     Strength   LEFT SHOULDER RIGHT SHOULDER   Flexion 5 / 5   Extension 5 / 5  Abductors 5 / 5   Adduction 5 / 5  External Rotation 5 / 5  Internal Rotation 5 / 5  Scapular Elevation 5 / 5  Scapular Protraction 5 / 5 Flexion 4 / 5   Extension 4 / 5  Abductors 4 / 5   Adduction 4 / 5  External Rotation 4 / 5  Internal Rotation 4 / 5  Scapular Elevation 5 / 5  Scapular Protraction 5 / 5     Special Testing:         Bicep Tendon L+ L-- R+ R-- Not Tested    Hook Test [] [x] [] [x] []    Chilo Sign [] [x] [] [x] []    Rotator Cuff         Full Can [] [x] [x] [] []    Empty Can [] [x] [x] [] []    Lift Off  [] [x] [x] [] []    Belly Press [] [x] [x] [] []    Hornblower [] [x] [] [x] []    Drop Arm [] [x] [] [x] []    AC Joint         Cross Arm Adduction [] [x] [] [x] []    Impingement         Hawkin's [] [x] [x] [] []    Painful Arc  [] [x] [x] [] []    Painful Arc 170-180 [] [x] [x] [] []    Instability         Shoulder Appreh. [] [x] [] [x] []    Labral         Ranger's [] [x] [] [x] []       Cervical ROM Pain negative  Neurovascular: Intact to light touch  Neuro/ Psych: Awake, alert, oriented, normal mood and affect  Lymphatic: No LAD  Skin/ Soft Tissue: no rash, skin intact   Assessment:   IMAGING: X-ray 4 views of right shoulder ordered, reviewed and independently interpreted by me. Discussed with patient. No acute findings .  Awaiting radiologist findings.     EMR REVIEW: completed with noted Referral documentation reviewed    DIAGNOSIS:  1. Rotator cuff syndrome, right    2. Obesity, unspecified classification, unspecified obesity type, unspecified whether serious comorbidity present       Plan:     Orders Placed This Encounter    X-ray Shoulder 2 or More Views Right    triamcinolone acetonide injection 40 mg    LIDOcaine (PF) 10 mg/ml (1%) injection 50 mg     Ongoing education about DX and treatment recommendations including  conservative treatments of daily HEP with TheraBand, muscle strengthening, adequate vit D/C, glucosamine 1500 mg/day and daily acetaminophen 1000 mg 3 times/ day if able to tolerate.    Treatment plan: Moderate exacerbation of chronic Right Rotator Cuff Syndrome Failed conservative treatments including: RX po NSAID see below.  Medication instructions below.  Today recommending HEP with Thera Band provided, formal RX PT, instructed to contact insurance for provider, and CSI.  If inadequate at f/u will consider MRI.  Procedure: CSI discussed, s/t failed conservative treatments patient consents to CSI today  RX Medications: continue medications as RX per PCP .  RTC: 4 months, telemedicine visit       Etta Dominguez FNP [facility]  Procedure Note:   Large Joint Aspiration/Injection: R subacromial bursa: R subacromial bursa    Date/Time: 10/3/2023 8:20 AM    Performed by: Etta Dominguez NP  Authorized by: Etta Dominguez NP    Location:  Shoulder  Site:  R subacromial bursa  Medications:  5 mL LIDOCAINE 1% (PF) 50 mg / 5 mL; 40 mg KENALOG 40 mg / 1 mL     Ortho Procedure Note   Procedure: RIGHT Shoulder SASD CSI     Indications: Therapeutic Indication - Decrease pain, Increase range of motion and improve quality of life:   Risks:  Possible complications with the injection include bleeding, infection (.01%), tendon rupture, steroid flare, fat pad or soft tissue atrophy, skin depigmentation, allergic reaction to medications and vasovagal response. (steroid flare treatment is rest, ice, NSAIDs and resolves in 24-36 hours)  Consent:  Procedure, risks, benefits, and alternatives explained the patient, who voiced understanding and agreed to proceed with procedure.  Consent signed and scanned into the medical record.   No absolute contraindications (cellulitis overlying joint, infection, lack of informed consent, allergy to injection medication or history of steroid flare) or  relative contraindications (uncontrolled DM2 A1c>10, coagulopathy, INR > 3.5, previous joint replacement or history of AVN).        Staff: tEta JORGE  Description:  Time-out performed.  The patient was prepped in normal sterile fashion use of chlorhexidine scrub and the appropriate and anatomic landmarks were identified.  Sterile 21 g 1.5 inch needle was used. Topical ethyl chloride was applied. Contents of syringe included:     RIGHT SHOULDER: 5 ml 1% PF lidocaine and 40 mg triamcinolone acetonide (Kenalog) injected    Complications: None   EBL: None   Post Procedure: Patient alert, and moving all extremities. ROM improved, pain decreased.  Good peripheral pulses, no signs of vascular compromise and range of motion intact.  Aftercare instructions were given to patient at time of discharge.  Relative rest for 3 days-avoiding excess activity.  Place ice on the area for 15 minutes every 4-6 hours. Patient may take Tylenol a 1000 mg b.i.d. or ibuprofen 600 mg t.i.d. for the next 3-4 days if not on medication already and safe to take pending co-morbidities.  Protect the area for the next 1-8 hours if anesthetic was used.  Avoid excessive activity for the next 3-4 weeks.  ER precautions given for fever, severe joint pain or allergic reaction or other new symptoms related to the joint injection.        Time Based Billing   Total Time Spent with Patient: 40 minutes Excludes Time Spent Performing Procedure  Visit Start Time: 0850  10 minutes spent prior to visit reviewing EMR, prior labs and x-rays.  20 minutes spent in visit with patient face-to-face time completing exam, obtaining history, educating on DX and treatment plan.  10 minutes spent after visit completing EMR documentation.   Visit End Time: 0930    Please be aware that this note has been generated with the assistance of [facility] voice-to-text.  Please excuse any spelling or grammatical errors.

## 2023-10-03 NOTE — LETTER
October 3, 2023      [facility]  [address] [place] [address]  Phone: [phone]       Patient: Beth Fragoso   YOB: 1975  Date of Visit: 10/03/2023    To Whom It May Concern:    Jaxon Fragoso  was at [facility] on 10/03/2023. The patient may return to work/school on 10/03/2023 with no restrictions. If you have any questions or concerns, or if I can be of further assistance, please do not hesitate to contact me.    Sincerely,    Etta Wagoner, NP      Chest pain

## 2023-11-06 ENCOUNTER — HOSPITAL ENCOUNTER (EMERGENCY)
Facility: HOSPITAL | Age: 48
Discharge: HOME | End: 2023-11-06
Attending: EMERGENCY MEDICINE
Payer: MEDICAID

## 2023-11-06 VITALS
HEART RATE: 105 BPM | WEIGHT: 186 LBS | OXYGEN SATURATION: 96 % | DIASTOLIC BLOOD PRESSURE: 84 MMHG | SYSTOLIC BLOOD PRESSURE: 150 MMHG | BODY MASS INDEX: 36.52 KG/M2 | TEMPERATURE: 98 F | RESPIRATION RATE: 18 BRPM | HEIGHT: 60 IN

## 2023-11-06 DIAGNOSIS — R42 DIZZINESS: ICD-10-CM

## 2023-11-06 DIAGNOSIS — E86.0 DEHYDRATION: Primary | ICD-10-CM

## 2023-11-06 PROBLEM — Z00.00 WELLNESS EXAMINATION: Status: RESOLVED | Noted: 2023-08-01 | Resolved: 2023-11-06

## 2023-11-06 LAB
ALBUMIN SERPL-MCNC: 3.8 G/DL (ref 3.5–5)
ALBUMIN/GLOB SERPL: 1.2 RATIO (ref 1.1–2)
ALP SERPL-CCNC: 78 UNIT/L (ref 40–150)
ALT SERPL-CCNC: 16 UNIT/L (ref 0–55)
AMPHET UR QL SCN: NEGATIVE
APPEARANCE UR: CLEAR
AST SERPL-CCNC: 17 UNIT/L (ref 5–34)
BACTERIA #/AREA URNS AUTO: ABNORMAL /HPF
BARBITURATE SCN PRESENT UR: NEGATIVE
BASOPHILS # BLD AUTO: 0.04 X10(3)/MCL
BASOPHILS NFR BLD AUTO: 0.3 %
BENZODIAZ UR QL SCN: NEGATIVE
BILIRUB SERPL-MCNC: 0.3 MG/DL
BILIRUB UR QL STRIP.AUTO: NEGATIVE
BUN SERPL-MCNC: 13.4 MG/DL (ref 7–18.7)
CALCIUM SERPL-MCNC: 9.7 MG/DL (ref 8.4–10.2)
CANNABINOIDS UR QL SCN: NEGATIVE
CHLORIDE SERPL-SCNC: 106 MMOL/L (ref 98–107)
CO2 SERPL-SCNC: 25 MMOL/L (ref 22–29)
COCAINE UR QL SCN: NEGATIVE
COLOR UR AUTO: COLORLESS
CREAT SERPL-MCNC: 0.68 MG/DL (ref 0.55–1.02)
EOSINOPHIL # BLD AUTO: 0.1 X10(3)/MCL (ref 0–0.9)
EOSINOPHIL NFR BLD AUTO: 0.8 %
ERYTHROCYTE [DISTWIDTH] IN BLOOD BY AUTOMATED COUNT: 14.4 % (ref 11.5–17)
ETHANOL SERPL-MCNC: <10 MG/DL
FENTANYL UR QL SCN: NEGATIVE
FLUAV AG UPPER RESP QL IA.RAPID: NOT DETECTED
FLUBV AG UPPER RESP QL IA.RAPID: NOT DETECTED
GFR SERPLBLD CREATININE-BSD FMLA CKD-EPI: >60 MLS/MIN/1.73/M2
GLOBULIN SER-MCNC: 3.3 GM/DL (ref 2.4–3.5)
GLUCOSE SERPL-MCNC: 119 MG/DL (ref 74–100)
GLUCOSE UR QL STRIP.AUTO: NORMAL
HCT VFR BLD AUTO: 41.7 % (ref 37–47)
HGB BLD-MCNC: 14 G/DL (ref 12–16)
IMM GRANULOCYTES # BLD AUTO: 0.05 X10(3)/MCL (ref 0–0.04)
IMM GRANULOCYTES NFR BLD AUTO: 0.4 %
KETONES UR QL STRIP.AUTO: NEGATIVE
LEUKOCYTE ESTERASE UR QL STRIP.AUTO: NEGATIVE
LIPASE SERPL-CCNC: 21 U/L
LYMPHOCYTES # BLD AUTO: 2.02 X10(3)/MCL (ref 0.6–4.6)
LYMPHOCYTES NFR BLD AUTO: 15.6 %
MAGNESIUM SERPL-MCNC: 2.2 MG/DL (ref 1.6–2.6)
MCH RBC QN AUTO: 31.4 PG (ref 27–31)
MCHC RBC AUTO-ENTMCNC: 33.6 G/DL (ref 33–36)
MCV RBC AUTO: 93.5 FL (ref 80–94)
MDMA UR QL SCN: NEGATIVE
MONOCYTES # BLD AUTO: 0.64 X10(3)/MCL (ref 0.1–1.3)
MONOCYTES NFR BLD AUTO: 4.9 %
NEUTROPHILS # BLD AUTO: 10.08 X10(3)/MCL (ref 2.1–9.2)
NEUTROPHILS NFR BLD AUTO: 78 %
NITRITE UR QL STRIP.AUTO: NEGATIVE
NRBC BLD AUTO-RTO: 0 %
OPIATES UR QL SCN: NEGATIVE
PCP UR QL: POSITIVE
PH UR STRIP.AUTO: 6 [PH]
PH UR: 6 [PH] (ref 3–11)
PLATELET # BLD AUTO: 323 X10(3)/MCL (ref 130–400)
PMV BLD AUTO: 10.6 FL (ref 7.4–10.4)
POTASSIUM SERPL-SCNC: 4.4 MMOL/L (ref 3.5–5.1)
PROT SERPL-MCNC: 7.1 GM/DL (ref 6.4–8.3)
PROT UR QL STRIP.AUTO: NEGATIVE
RBC # BLD AUTO: 4.46 X10(6)/MCL (ref 4.2–5.4)
RBC #/AREA URNS AUTO: ABNORMAL /HPF
RBC UR QL AUTO: NEGATIVE
SARS-COV-2 RNA RESP QL NAA+PROBE: NOT DETECTED
SODIUM SERPL-SCNC: 140 MMOL/L (ref 136–145)
SP GR UR STRIP.AUTO: 1.01 (ref 1–1.03)
SPECIFIC GRAVITY, URINE AUTO (.000) (OHS): 1.01 (ref 1–1.03)
SQUAMOUS #/AREA URNS LPF: ABNORMAL /HPF
UROBILINOGEN UR STRIP-ACNC: NORMAL
WBC # SPEC AUTO: 12.93 X10(3)/MCL (ref 4.5–11.5)
WBC #/AREA URNS AUTO: ABNORMAL /HPF

## 2023-11-06 PROCEDURE — 85025 COMPLETE CBC W/AUTO DIFF WBC: CPT | Performed by: EMERGENCY MEDICINE

## 2023-11-06 PROCEDURE — 80053 COMPREHEN METABOLIC PANEL: CPT | Performed by: EMERGENCY MEDICINE

## 2023-11-06 PROCEDURE — 83690 ASSAY OF LIPASE: CPT | Performed by: EMERGENCY MEDICINE

## 2023-11-06 PROCEDURE — 25000003 PHARM REV CODE 250: Performed by: EMERGENCY MEDICINE

## 2023-11-06 PROCEDURE — 99285 EMERGENCY DEPT VISIT HI MDM: CPT | Mod: 25

## 2023-11-06 PROCEDURE — 81001 URINALYSIS AUTO W/SCOPE: CPT | Performed by: EMERGENCY MEDICINE

## 2023-11-06 PROCEDURE — 80307 DRUG TEST PRSMV CHEM ANLYZR: CPT | Performed by: EMERGENCY MEDICINE

## 2023-11-06 PROCEDURE — 0240U COVID/FLU A&B PCR: CPT | Performed by: EMERGENCY MEDICINE

## 2023-11-06 PROCEDURE — 82077 ASSAY SPEC XCP UR&BREATH IA: CPT | Performed by: EMERGENCY MEDICINE

## 2023-11-06 PROCEDURE — 83735 ASSAY OF MAGNESIUM: CPT | Performed by: EMERGENCY MEDICINE

## 2023-11-06 PROCEDURE — 63600175 PHARM REV CODE 636 W HCPCS: Performed by: EMERGENCY MEDICINE

## 2023-11-06 PROCEDURE — 96374 THER/PROPH/DIAG INJ IV PUSH: CPT

## 2023-11-06 RX ORDER — ONDANSETRON 4 MG/1
4 TABLET, ORALLY DISINTEGRATING ORAL EVERY 6 HOURS PRN
Qty: 8 TABLET | Refills: 0 | Status: SHIPPED | OUTPATIENT
Start: 2023-11-06 | End: 2023-11-08

## 2023-11-06 RX ORDER — ONDANSETRON 2 MG/ML
4 INJECTION INTRAMUSCULAR; INTRAVENOUS
Status: COMPLETED | OUTPATIENT
Start: 2023-11-06 | End: 2023-11-06

## 2023-11-06 RX ADMIN — ONDANSETRON 4 MG: 2 INJECTION INTRAMUSCULAR; INTRAVENOUS at 01:11

## 2023-11-06 RX ADMIN — SODIUM CHLORIDE 1000 ML: 9 INJECTION, SOLUTION INTRAVENOUS at 01:11

## 2023-11-06 NOTE — Clinical Note
"Beth Mckenzievickie" Deculus was seen and treated in our emergency department on 11/6/2023.  She may return to work on 11/07/2023.       If you have any questions or concerns, please don't hesitate to call.      Harpreet Boucher III, MD"

## 2023-11-06 NOTE — ED PROVIDER NOTES
"Encounter Date: 11/6/2023       History     Chief Complaint   Patient presents with    Nausea     Pt c/o nausea today after feeling off balance and "numb" Saturday night after having drinks with someone. Pt was able to go to work today without any "numbness' still feeling off balance. Pt ambulated into triage room with steady gate. No facial droop, motor drift, slurred speech, numbness or tingling, AAOx4.      Patient states that has been dizzy and nauseous today states that last night she went on a blind date and had 2 drinks when she started the 2nd drink she started to feel really often dizzy.  Patient states she went home felt flush and then went to bed patient states all day has been numb all over threw up twice without blood in it no diarrhea and some mild abdominal cramping states this been off balance.  She states that she is normally able to tolerate 2 drinks states that the drinks were not out of her sight the came directly from the .  No sick contacts no fevers no chills    The history is provided by the patient.     Review of patient's allergies indicates:   Allergen Reactions    Toradol [ketorolac]      No past medical history on file.  Past Surgical History:   Procedure Laterality Date    BRAIN SURGERY       Family History   Problem Relation Age of Onset    Hypertension Mother     Diabetes Mother     Heart disease Father      Social History     Tobacco Use    Smoking status: Some Days     Average packs/day: 0.5 packs/day for 38.8 years (19.0 ttl pk-yrs)     Types: Cigarettes     Start date: 1985    Smokeless tobacco: Never     Review of Systems   Constitutional:  Negative for fever.   HENT:  Negative for sore throat.    Respiratory:  Negative for shortness of breath.    Cardiovascular:  Negative for chest pain.   Gastrointestinal:  Negative for nausea.   Genitourinary:  Negative for difficulty urinating, dyspareunia, dysuria, menstrual problem, vaginal bleeding and vaginal discharge. "   Musculoskeletal:  Negative for back pain and gait problem.   Skin:  Negative for rash.   Neurological:  Positive for light-headedness, numbness and headaches. Negative for weakness.   Hematological:  Does not bruise/bleed easily.       Physical Exam     Initial Vitals [11/06/23 0025]   BP Pulse Resp Temp SpO2   (!) 150/84 105 18 98.1 °F (36.7 °C) 96 %      MAP       --         Physical Exam    ED Course   Procedures  Labs Reviewed   COMPREHENSIVE METABOLIC PANEL - Abnormal; Notable for the following components:       Result Value    Glucose Level 119 (*)     All other components within normal limits   URINALYSIS - Abnormal; Notable for the following components:    Color, UA Colorless (*)     All other components within normal limits   DRUG SCREEN, URINE (BEAKER) - Abnormal; Notable for the following components:    Phencyclidine, Urine Positive (*)     All other components within normal limits    Narrative:     Cut off concentrations:    Amphetamines - 1000 ng/ml  Barbiturates - 200 ng/ml  Benzodiazepine - 200 ng/ml  Cannabinoids (THC) - 50 ng/ml  Cocaine - 300 ng/ml  Fentanyl - 1.0 ng/ml  MDMA - 500 ng/ml  Opiates - 300 ng/ml   Phencyclidine (PCP) - 25 ng/ml    Specimen submitted for drug analysis and tested for pH and specific gravity in order to evaluate sample integrity. Suspect tampering if specific gravity is <1.003 and/or pH is not within the range of 4.5 - 8.0  False negatives may result form substances such as bleach added to urine.  False positives may result for the presence of a substance with similar chemical structure to the drug or its metabolite.    This test provides only a PRELIMINARY analytical test result. A more specific alternate chemical method must be used in order to obtain a confirmed analytical result. Gas chromatography/mass spectrometry (GC/MS) is the preferred confirmatory method. Other chemical confirmation methods are available. Clinical consideration and professional judgement  should be applied to any drug of abuse test result, particularly when preliminary positive results are used.    Positive results will be confirmed only at the physicians request. Unconfirmed screening results are to be used only for medical purposes (treatment).        CBC WITH DIFFERENTIAL - Abnormal; Notable for the following components:    WBC 12.93 (*)     MCH 31.4 (*)     MPV 10.6 (*)     Neut # 10.08 (*)     IG# 0.05 (*)     All other components within normal limits   ALCOHOL,MEDICAL (ETHANOL) - Normal   LIPASE - Normal   MAGNESIUM - Normal   COVID/FLU A&B PCR - Normal    Narrative:     The Xpert Xpress SARS-CoV-2/FLU/RSV plus is a rapid, multiplexed real-time PCR test intended for the simultaneous qualitative detection and differentiation of SARS-CoV-2, Influenza A, Influenza B, and respiratory syncytial virus (RSV) viral RNA in either nasopharyngeal swab or nasal swab specimens.         CBC W/ AUTO DIFFERENTIAL    Narrative:     The following orders were created for panel order CBC auto differential.  Procedure                               Abnormality         Status                     ---------                               -----------         ------                     CBC with Differential[[id number]]       Abnormal            Final result                 Please view results for these tests on the individual orders.          Imaging Results              CT Head Without Contrast (Preliminary result)  Result time 11/06/23 02:02:10      Preliminary result by Jerry Estrada MD (11/06/23 02:02:10)                   Narrative:    START OF REPORT:  Technique: CT of the head was performed without intravenous contrast with axial as well as coronal and sagittal images.    Comparison: None.    Dosage Information: Automated exposure control was utilized.    Clinical history: Dizziness.    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  Hemorrhage: No acute intracranial  hemorrhage is seen.  CSF spaces: The ventricles sulci and basal cisterns are within normal limits.  Brain parenchyma: Unremarkable with preservation of the grey white junction throughout.  Cerebellum: Unremarkable.  Vascular: Unremarkable venous sinuses. There is metallic aneurysm coil material in the region of the intracranial left internal carotid near the sella.  Sella and skull base: The sella appears to be within normal limits for age.  Intracranial calcifications: Incidental note is made of some pineal region calcification.  Calvarium: No acute linear or depressed skull fracture is seen.    Maxillofacial Structures:  Paranasal sinuses: The visualized paranasal sinuses appear clear with no mucoperiosteal thickening or air fluid levels identified.  Orbits: The orbits appear unremarkable.  Zygomatic arches: The zygomatic arches are intact and unremarkable.  Temporal bones and mastoids: The temporal bones and mastoids appear unremarkable.  TMJ: The mandibular condyles appear normally placed with respect to the mandibular fossa.  Nasal Bones: No displaced nasal bone fracture is seen.      Impression:  1. No acute intracranial process identified. Details and other findings as noted above.                                         Medications   sodium chloride 0.9% bolus 1,000 mL 1,000 mL (1,000 mLs Intravenous New Bag 11/6/23 0121)   ondansetron injection 4 mg (4 mg Intravenous Given 11/6/23 0121)     Medical Decision Making  Differential diagnosis is intoxication alcohol hangover electrolyte disturbance UTI    CBC chemistry without abnormality U tox is positive for PCP but does not have symptoms consistent with that toxidrome.  Does feel better after IV fluids and Zofran neurologically intact CT head without abnormality will discharge Zofran rest fluids follow up primary care    Problems Addressed:  Dehydration: complicated acute illness or injury  Dizziness: complicated acute illness or injury    Amount and/or  Complexity of Data Reviewed  Labs: ordered.  Radiology: ordered.    Risk  Prescription drug management.               ED Course as of 11/06/23 0320   Mon Nov 06, 2023 0236 Mild leukocytosis patient does have U tox is positive but no reported ingestions or use could be contaminant [FK]   0236 Or false positive [FK]      ED Course User Index  [FK] Harpreet Boucher III, MD                    Clinical Impression:   Final diagnoses:  [E86.0] Dehydration (Primary)  [R42] Dizziness        ED Disposition Condition    Discharge Stable          ED Prescriptions       Medication Sig Dispense Start Date End Date Auth. Provider    ondansetron (ZOFRAN-ODT) 4 MG TbDL Take 1 tablet (4 mg total) by mouth every 6 (six) hours as needed. 8 tablet 11/6/2023 11/8/2023 Harpreet Boucher III, MD          Follow-up Information       Follow up With Specialties Details Why Contact Mariana Wade FNP Nurse Practitioner In 1 week  [address] [facility] [place] [address] [phone]               Harpreet Boucher III, MD  11/06/23 0320

## 2024-02-06 PROBLEM — J18.9 PNEUMONIA: Status: ACTIVE | Noted: 2024-02-06

## 2024-03-07 PROBLEM — J98.9 CHRONIC AIRWAY DISEASE: Status: ACTIVE | Noted: 2024-03-07
